# Patient Record
Sex: MALE | Race: OTHER | NOT HISPANIC OR LATINO
[De-identification: names, ages, dates, MRNs, and addresses within clinical notes are randomized per-mention and may not be internally consistent; named-entity substitution may affect disease eponyms.]

---

## 2019-01-23 PROBLEM — Z00.00 ENCOUNTER FOR PREVENTIVE HEALTH EXAMINATION: Status: ACTIVE | Noted: 2019-01-23

## 2019-01-25 ENCOUNTER — APPOINTMENT (OUTPATIENT)
Dept: CARDIOLOGY | Facility: CLINIC | Age: 54
End: 2019-01-25

## 2019-01-25 VITALS
WEIGHT: 148 LBS | HEART RATE: 70 BPM | BODY MASS INDEX: 21.92 KG/M2 | SYSTOLIC BLOOD PRESSURE: 112 MMHG | HEIGHT: 69 IN | DIASTOLIC BLOOD PRESSURE: 70 MMHG

## 2019-01-25 NOTE — HISTORY OF PRESENT ILLNESS
[FreeTextEntry1] : Former patient of Dr. Francois\par \par Intermittent substernal sharp chest pain, nonradiating, 6-7/10, lasts ~1 min, at rest and on exertion, relieved with rest and meditation, a/w stress\par \par Palpitations that wake him from sleep\par \par No h/o MI and CHF\par \par Prediabetic\par

## 2019-02-06 ENCOUNTER — APPOINTMENT (OUTPATIENT)
Dept: CARDIOLOGY | Facility: CLINIC | Age: 54
End: 2019-02-06

## 2019-02-06 VITALS
HEIGHT: 69 IN | WEIGHT: 150 LBS | SYSTOLIC BLOOD PRESSURE: 114 MMHG | DIASTOLIC BLOOD PRESSURE: 60 MMHG | BODY MASS INDEX: 22.22 KG/M2

## 2019-02-06 NOTE — HISTORY OF PRESENT ILLNESS
[FreeTextEntry1] : SE reviewed: No ischemia\par \par Intermittent substernal sharp chest pain, nonradiating, 6-7/10, lasts ~1 min, at rest and on exertion, relieved with rest and meditation, a/w stress\par \par Palpitations that wake him from sleep\par \par No h/o MI and CHF\par \par Prediabetic\par

## 2019-02-06 NOTE — PHYSICAL EXAM
[General Appearance - Well Developed] : well developed [General Appearance - In No Acute Distress] : no acute distress [Normal Conjunctiva] : the conjunctiva exhibited no abnormalities [Eyelids - No Xanthelasma] : the eyelids demonstrated no xanthelasmas [Respiration, Rhythm And Depth] : normal respiratory rhythm and effort [Exaggerated Use Of Accessory Muscles For Inspiration] : no accessory muscle use [Auscultation Breath Sounds / Voice Sounds] : lungs were clear to auscultation bilaterally [Heart Rate And Rhythm] : heart rate and rhythm were normal [Heart Sounds] : normal S1 and S2 [Murmurs] : no murmurs present [Abdomen Soft] : soft [Abdomen Tenderness] : non-tender [] : no hepato-splenomegaly [Abdomen Mass (___ Cm)] : no abdominal mass palpated [Abnormal Walk] : normal gait [Gait - Sufficient For Exercise Testing] : the gait was sufficient for exercise testing [Nail Clubbing] : no clubbing of the fingernails [Cyanosis, Localized] : no localized cyanosis [Skin Color & Pigmentation] : normal skin color and pigmentation [No Skin Ulcers] : no skin ulcer [Oriented To Time, Place, And Person] : oriented to person, place, and time [FreeTextEntry1] : no JVD

## 2020-02-26 ENCOUNTER — APPOINTMENT (OUTPATIENT)
Dept: CARDIOLOGY | Facility: CLINIC | Age: 55
End: 2020-02-26
Payer: MEDICAID

## 2020-02-26 VITALS
HEART RATE: 71 BPM | SYSTOLIC BLOOD PRESSURE: 118 MMHG | BODY MASS INDEX: 22.74 KG/M2 | WEIGHT: 154 LBS | DIASTOLIC BLOOD PRESSURE: 78 MMHG

## 2020-02-26 DIAGNOSIS — R00.2 PALPITATIONS: ICD-10-CM

## 2020-02-26 DIAGNOSIS — R07.9 CHEST PAIN, UNSPECIFIED: ICD-10-CM

## 2020-02-26 DIAGNOSIS — Z87.891 PERSONAL HISTORY OF NICOTINE DEPENDENCE: ICD-10-CM

## 2020-02-26 PROCEDURE — 99213 OFFICE O/P EST LOW 20 MIN: CPT

## 2020-02-26 PROCEDURE — 93000 ELECTROCARDIOGRAM COMPLETE: CPT

## 2020-02-26 NOTE — HISTORY OF PRESENT ILLNESS
[FreeTextEntry1] : 55 yo M with h/o Palpitations presents for follow up.  Works as [employer] , stressed about getting tickets and c/o occasional palpitations while driving.  BP is normal.  Left flank pain possible kidney stone.  Stress echo last year was negative for ischemia.\par \par EKG (2/26/2020):  NSR, Normal\par \par

## 2024-12-06 NOTE — PHYSICAL EXAM
Name: Raulito Hallman      : 1969      MRN: [id number]  Encounter Provider: HARISH Noble  Encounter Date: 2024   Encounter department: [facility]  :  Assessment & Plan  Type 2 diabetes mellitus without complication, without long-term current use of insulin (HCC)    Lab Results   Component Value Date    HGBA1C 7.7 (A) 2024   Significant improvement from previous A1c of 9.8 since starting Jardiance. Continue Jardiance 25 mg & metformin 1000 mg BID.     Orders:    POCT hemoglobin A1c    Primary hypertension  BP Readings from Last 3 Encounters:   24 126/76   24 148/88   10/09/24 146/90     - At goal. Continue exforge daily.       Discomfort of right hip    Orders:    XR hip/pelvis 4+ vw right if performed; Future    Mixed hyperlipidemia  Lab Results   Component Value Date    CHOLESTEROL 89 2024    CHOLESTEROL 119 10/29/2022    CHOLESTEROL 117 2021     Lab Results   Component Value Date    HDL 54 2024    HDL 39 (L) 10/29/2022    HDL 36 (L) 2021     Lab Results   Component Value Date    TRIG 74 2024    TRIG 84 10/29/2022    TRIG 99 2021     Lab Results   Component Value Date    NONHDLC 35 2024     Lab Results   Component Value Date    LDLCALC 20 2024     - At goal. Continue Atorvastatin 40 mg daily.               History of Present Illness     Raulito Hallman is a 55 y.o. male  has a past medical history of Diabetes mellitus (HCC), Glaucoma, and Hypertension.  has a past surgical history that includes Hernia repair; Carpal tunnel release; and IR biopsy bone marrow (2024).    Here today for HTN/HLD/DM f/u. He reports that his right hip has been bothering him lately. He reports limited ROM and weakness. Denies pain, no recent injury. He has not seeked treatment for this or tried medication.       Review of Systems  As per HPI         Objective   /76 (BP Location: Left arm, Patient  "Position: Sitting, Cuff Size: Large)   Pulse 77   Temp 97.6 °F (36.4 °C) (Temporal)   Resp 18   Ht 5' 6\" (1.676 m)   Wt 91.7 kg (202 lb 1.6 oz)   SpO2 98%   BMI 32.62 kg/m²      Physical Exam  Vitals and nursing note reviewed.   Constitutional:       Appearance: He is well-developed. He is obese.   HENT:      Head: Normocephalic and atraumatic.      Right Ear: External ear normal.      Left Ear: External ear normal.      Nose: Nose normal.   Eyes:      Conjunctiva/sclera: Conjunctivae normal.   Cardiovascular:      Rate and Rhythm: Normal rate and regular rhythm.      Pulses: Normal pulses.      Heart sounds: Normal heart sounds. No murmur heard.  Pulmonary:      Effort: Pulmonary effort is normal. No respiratory distress.      Breath sounds: Normal breath sounds.   Abdominal:      Palpations: Abdomen is soft.      Tenderness: There is no abdominal tenderness.   Musculoskeletal:         General: Normal range of motion.      Cervical back: Normal range of motion and neck supple.      Right hip: No deformity, lacerations, tenderness, bony tenderness or crepitus. Normal range of motion. Decreased strength.      Left hip: Normal.      Comments: Right hip: discomfort with internal flexion, pt unable to stand on right leg only.    Skin:     General: Skin is warm and dry.      Capillary Refill: Capillary refill takes less than 2 seconds.   Neurological:      Mental Status: He is alert and oriented to person, place, and time. Mental status is at baseline.   Psychiatric:         Mood and Affect: Mood normal.         Behavior: Behavior normal.         Thought Content: Thought content normal.         Judgment: Judgment normal.         " [General Appearance - Well Developed] : well developed [General Appearance - In No Acute Distress] : no acute distress [Normal Conjunctiva] : the conjunctiva exhibited no abnormalities [Eyelids - No Xanthelasma] : the eyelids demonstrated no xanthelasmas [Heart Rate And Rhythm] : heart rate and rhythm were normal [Heart Sounds] : normal S1 and S2 [Murmurs] : no murmurs present [Respiration, Rhythm And Depth] : normal respiratory rhythm and effort [Exaggerated Use Of Accessory Muscles For Inspiration] : no accessory muscle use [Auscultation Breath Sounds / Voice Sounds] : lungs were clear to auscultation bilaterally [Abdomen Soft] : soft [Abdomen Tenderness] : non-tender [] : no hepato-splenomegaly [Abdomen Mass (___ Cm)] : no abdominal mass palpated [Abnormal Walk] : normal gait [Gait - Sufficient For Exercise Testing] : the gait was sufficient for exercise testing [Nail Clubbing] : no clubbing of the fingernails [Cyanosis, Localized] : no localized cyanosis [Skin Color & Pigmentation] : normal skin color and pigmentation [No Skin Ulcers] : no skin ulcer [Oriented To Time, Place, And Person] : oriented to person, place, and time [FreeTextEntry1] : no JVD